# Patient Record
Sex: MALE | Race: WHITE | NOT HISPANIC OR LATINO | Employment: FULL TIME | URBAN - METROPOLITAN AREA
[De-identification: names, ages, dates, MRNs, and addresses within clinical notes are randomized per-mention and may not be internally consistent; named-entity substitution may affect disease eponyms.]

---

## 2021-05-29 ENCOUNTER — RECORDS - HEALTHEAST (OUTPATIENT)
Dept: ADMINISTRATIVE | Facility: CLINIC | Age: 31
End: 2021-05-29

## 2021-06-02 ENCOUNTER — RECORDS - HEALTHEAST (OUTPATIENT)
Dept: ADMINISTRATIVE | Facility: CLINIC | Age: 31
End: 2021-06-02

## 2023-11-01 ENCOUNTER — OFFICE VISIT (OUTPATIENT)
Dept: FAMILY MEDICINE | Facility: CLINIC | Age: 33
End: 2023-11-01
Payer: COMMERCIAL

## 2023-11-01 VITALS
TEMPERATURE: 98.9 F | BODY MASS INDEX: 19.15 KG/M2 | WEIGHT: 122 LBS | OXYGEN SATURATION: 98 % | DIASTOLIC BLOOD PRESSURE: 70 MMHG | HEIGHT: 67 IN | SYSTOLIC BLOOD PRESSURE: 106 MMHG | HEART RATE: 84 BPM | RESPIRATION RATE: 20 BRPM

## 2023-11-01 DIAGNOSIS — F69 BEHAVIOR CONCERN IN ADULT: ICD-10-CM

## 2023-11-01 DIAGNOSIS — F41.1 GAD (GENERALIZED ANXIETY DISORDER): ICD-10-CM

## 2023-11-01 DIAGNOSIS — F33.41 RECURRENT MAJOR DEPRESSIVE DISORDER, IN PARTIAL REMISSION (H): Primary | ICD-10-CM

## 2023-11-01 PROCEDURE — 99204 OFFICE O/P NEW MOD 45 MIN: CPT | Performed by: FAMILY MEDICINE

## 2023-11-01 RX ORDER — ESCITALOPRAM OXALATE 10 MG/1
10 TABLET ORAL DAILY
Qty: 30 TABLET | Refills: 5 | Status: SHIPPED | OUTPATIENT
Start: 2023-11-01

## 2023-11-01 ASSESSMENT — COLUMBIA-SUICIDE SEVERITY RATING SCALE - C-SSRS
1. WITHIN THE PAST MONTH, HAVE YOU WISHED YOU WERE DEAD OR WISHED YOU COULD GO TO SLEEP AND NOT WAKE UP?: YES
2. IN THE PAST MONTH, HAVE YOU ACTUALLY HAD ANY THOUGHTS OF KILLING YOURSELF?: NO
6. HAVE YOU EVER DONE ANYTHING, STARTED TO DO ANYTHING, OR PREPARED TO DO ANYTHING TO END YOUR LIFE?: NO

## 2023-11-01 ASSESSMENT — ANXIETY QUESTIONNAIRES
GAD7 TOTAL SCORE: 20
6. BECOMING EASILY ANNOYED OR IRRITABLE: NEARLY EVERY DAY
2. NOT BEING ABLE TO STOP OR CONTROL WORRYING: NEARLY EVERY DAY
IF YOU CHECKED OFF ANY PROBLEMS ON THIS QUESTIONNAIRE, HOW DIFFICULT HAVE THESE PROBLEMS MADE IT FOR YOU TO DO YOUR WORK, TAKE CARE OF THINGS AT HOME, OR GET ALONG WITH OTHER PEOPLE: EXTREMELY DIFFICULT
GAD7 TOTAL SCORE: 20
7. FEELING AFRAID AS IF SOMETHING AWFUL MIGHT HAPPEN: NEARLY EVERY DAY
5. BEING SO RESTLESS THAT IT IS HARD TO SIT STILL: MORE THAN HALF THE DAYS
3. WORRYING TOO MUCH ABOUT DIFFERENT THINGS: NEARLY EVERY DAY
1. FEELING NERVOUS, ANXIOUS, OR ON EDGE: NEARLY EVERY DAY

## 2023-11-01 ASSESSMENT — PATIENT HEALTH QUESTIONNAIRE - PHQ9
SUM OF ALL RESPONSES TO PHQ QUESTIONS 1-9: 21
SUM OF ALL RESPONSES TO PHQ QUESTIONS 1-9: 21
5. POOR APPETITE OR OVEREATING: NEARLY EVERY DAY
10. IF YOU CHECKED OFF ANY PROBLEMS, HOW DIFFICULT HAVE THESE PROBLEMS MADE IT FOR YOU TO DO YOUR WORK, TAKE CARE OF THINGS AT HOME, OR GET ALONG WITH OTHER PEOPLE: EXTREMELY DIFFICULT

## 2023-11-01 NOTE — COMMUNITY RESOURCES LIST (ENGLISH)
11/01/2023   [facility]  N/A  For questions about this resource list or additional care needs, please contact your primary care clinic or care manager.  Phone: [phone]   Email: N/A   Address: [address] [place] [address]   Hours: N/A        Financial Stability       Rent and mortgage payment assistance  1  [facility] ([facility]) - [place] Office - Supportive Housing Assistance Program (SHAP) - Rent and mortgage payment assistance Distance: 3.05 miles      Phone/Virtual   [address] [place] [address]  Language: English, Hmong, Caroline, Polish  Hours: Mon - Fri 8:30 AM - 5:00 PM  Fees: Free   Phone: (930) 761-3017 Email: [email or web address]@Vivartes.org Website: http://www.Vivartes.org/[facility]-impact-areas/     2  [facility] Stability - Rent and Mortgage Payment Assistance Distance: 3.38 miles      Phone/Virtual   [address] [place] [address]  Language: English, Hmong, Cymraes  Hours: Mon - Fri Appt. Only  Fees: Free   Phone: (107) 125-7720 Website: https://neighborhoodhousemn.org/          Food and Nutrition       Food pantry  3  [facility] - Food Distribution Program Distance: 0.35 miles      In-Person   [address] [place] [address]  Language: English, Hmong, Cymraes  Hours: Tue 3:00 PM - 5:00 PM  Fees: Free   Phone: (553) 503-7412 Email: info@theAlderaAptibleation.org Website: http://[facility].org/programs/rsjjsc-usmlrbdzy-tysadq/     4  [facility] Distance: 2.93 miles      Pickup   [address] [place] [address]  Language: American Sign Language, English, Hmong, Polish, Cymraes  Hours: Mon - Fri 12:00 PM - 1:00 PM  Fees: Free   Phone: (297) 988-1639 Email: info@Xcalar.org Website: https://www.[facility].org/locations/[facility]_ymca?utm_source=Homesnap&utm_medium=local&utm_campaign=local%20search     SNAP application assistance  5  [facility] -  [employer] - SNAP Outreach Distance: 3.38 miles      Phone/Virtual   [address] [place] [address]  Language: Mongolian, English, Hmong, Caroline, East Timorese  Hours: Mon - Fri 10:00 AM - 12:00 PM , Mon - Fri 2:00 PM - 4:00 PM  Fees: Free   Phone: (513) 700-3216 Website: https://[facility].org/     6  [place] Department of Human Services - MNFoodHelper (SNAP) Distance: 3.76 miles      Phone/Virtual   [address] [address] [place] [address]  Language: English, Hmong, Egyptian, Mongolian, East Timorese, Chadian  Hours: Mon - Fri 9:00 AM - 5:00 PM  Fees: Free   Phone: (362) 784-5752 Website: https://[place].gov/dhs/people-we-serve/adults/economic-assistance/food-nutrition/programs-and-services/supplemental-nutrition-assistance-program.jsp     Soup kitchen or free meals  7  [facility] - Loaves and Fishes - Loaves and Fishes Distance: 3.18 miles      [facility]   [address] [place] [address]  Language: English  Hours: Wed 5:30 PM - 6:30 PM  Fees: Free   Phone: (280) 224-6939 Email: office@[facility].org Website: https://www.[facility].org     8  [facility] - [facility] & Programs Distance: 3.59 miles      In-Person   [facility] [place] [address]  Language: English  Hours: Mon - Sun 6:30 AM - 7:30 AM , Mon - Sun 12:00 PM - 1:00 PM , Mon - Sun 5:30 PM - 6:30 PM  Fees: Free   Phone: (588) 752-2239 Email: info@[facility].org Website: https://[facility].org/about-us/contact/          Important Numbers & Websites       Emergency Services   911  City Services   311  Poison Control   (939) 942-3467  Suicide Prevention Lifeline   (881) 722-2156 (TALK)  Child Abuse Hotline   (237) 976-4349 (4-A-Child)  Sexual Assault Hotline   (547) 636-8611 (HOPE)  National Runaway Safeline   (215) 553-1497 (RUNAWAY)  All-Options Talkline   (415) 624-4988  Substance Abuse Referral   (269) 753-2926 (HELP)

## 2023-11-01 NOTE — PROGRESS NOTES
Assessment & Plan     (F33.41) Recurrent major depressive disorder, in partial remission (H24)  (primary encounter diagnosis)  Comment: pt has depression for years. He had treatment one year ago at treatment center for alcohol abuse, he took medication for around < 4 weeks and dced from the treatment center and medication dced.   He has been sober since the detox treatment. He was a heavy drinker for long time. Denies smoke and drugs.   He has three children 2, 3 and 11 years old, he is . He works as . He is the only one who works at his family. He is very stress about loss the job and not able to support his family.   Sometimes he thought about better off dead, but never have plan and never hurt himself and other people. He state he has a family to take care and will never hurt himself.   Phq9 score 21.     Plan: Adult Mental Health  Referral, Adult         Mental Health  Referral, escitalopram         (LEXAPRO) 10 MG tablet        Strongly advise to keep sober. We discussed the treatment with mental health referral for therapy and medication management. Will start lexapro first, advise to take half tablet for one to two weeks then take whole tablet side effect addressed.   Strongly advise to call 911 if he experience mental crisis and he understands.   Follow-up in one month.     (F41.1) BONITA (generalized anxiety disorder)  Comment: pt has anxiety for years. Sometimes he has panic attack. He has been sober for > one year.   Plan: Adult Mental Health  Referral, Adult         Mental Health  Referral, escitalopram         (LEXAPRO) 10 MG tablet        Will take lexapro 5 mg for 1-2 weeks then 10 mg daily. Side effect addressed.  Follow-up in one month.     (F69) Behavior concern in adult  Comment: pt state he has hard time to focus at work. He dose construction work. He denies childhood adhd.   Plan: Adult Mental Health  Referral        Will have  mental health testing.          Depression Screening Follow Up        11/1/2023     3:08 PM   PHQ   PHQ-9 Total Score 21   Q9: Thoughts of better off dead/self-harm past 2 weeks Several days   F/U: Thoughts of suicide or self-harm Yes   F/U: Self harm-plan No   F/U: Self-harm action No   F/U: Safety concerns No         11/1/2023     3:08 PM   Last PHQ-9   1.  Little interest or pleasure in doing things 3   2.  Feeling down, depressed, or hopeless 3   3.  Trouble falling or staying asleep, or sleeping too much 1   4.  Feeling tired or having little energy 3   5.  Poor appetite or overeating 3   6.  Feeling bad about yourself 3   7.  Trouble concentrating 3   8.  Moving slowly or restless 1   Q9: Thoughts of better off dead/self-harm past 2 weeks 1   PHQ-9 Total Score 21   In the past two weeks have you had thoughts of suicide or self harm? Yes   Do you have concerns about your personal safety or the safety of others? No   In the past 2 weeks have you thought about a plan or had intention to harm yourself? No   In the past 2 weeks have you acted on these thoughts in any way? No             11/1/2023     3:51 PM   C-SSRS (Brief [place])   Within the last month, have you wished you were dead or wished you could go to sleep and not wake up? Yes   Within the last month, have you had any actual thoughts of killing yourself? No   Within the last month, have you ever done anything, started to do anything, or prepared to do anything to end your life? No       Follow Up        Follow Up Actions Taken  Crisis resource information provided in the After Visit Summary    Discussed the following ways the patient can remain in a safe environment:  remove alcohol, remove drugs, dispose of old medications , and be around others  See Patient Instructions. Pt declined vaccine today.     Nevaeh Carmichael MD  [facility] SADIE Olmos is a 33 year old, presenting for the following health issues:  Establish  Care (Discuss starting back on anxiety/ depression medications, discuss ADHD)        11/1/2023     3:14 PM   Additional Questions   Roomed by Gen SUMAYA CMA       History of Present Illness       Reason for visit:  Depretion anxiaty adhd    He eats 0-1 servings of fruits and vegetables daily.He consumes 2 sweetened beverage(s) daily.He exercises with enough effort to increase his heart rate 60 or more minutes per day.  He exercises with enough effort to increase his heart rate 5 days per week.   He is taking medications regularly.         Depression and Anxiety Follow-Up  How are you doing with your depression since your last visit? No change  How are you doing with your anxiety since your last visit?  No change  Are you having other symptoms that might be associated with depression or anxiety? No  Have you had a significant life event? No   Do you have any concerns with your use of alcohol or other drugs? No    Social History     Tobacco Use    Smoking status: Never     Passive exposure: Never    Smokeless tobacco: Never   Vaping Use    Vaping Use: Some days    Substances: Nicotine   Substance Use Topics    Alcohol use: Not Currently     Comment: sober since 10/2022    Drug use: Never         11/1/2023     3:08 PM   PHQ   PHQ-9 Total Score 21   Q9: Thoughts of better off dead/self-harm past 2 weeks Several days   F/U: Thoughts of suicide or self-harm Yes   F/U: Self harm-plan No   F/U: Self-harm action No   F/U: Safety concerns No         11/1/2023     3:21 PM   BONITA-7 SCORE   Total Score 20         11/1/2023     3:08 PM   Last PHQ-9   1.  Little interest or pleasure in doing things 3   2.  Feeling down, depressed, or hopeless 3   3.  Trouble falling or staying asleep, or sleeping too much 1   4.  Feeling tired or having little energy 3   5.  Poor appetite or overeating 3   6.  Feeling bad about yourself 3   7.  Trouble concentrating 3   8.  Moving slowly or restless 1   Q9: Thoughts of better off dead/self-harm past 2  "weeks 1   PHQ-9 Total Score 21   In the past two weeks have you had thoughts of suicide or self harm? Yes   Do you have concerns about your personal safety or the safety of others? No   In the past 2 weeks have you thought about a plan or had intention to harm yourself? No   In the past 2 weeks have you acted on these thoughts in any way? No         11/1/2023     3:21 PM   BONITA-7    1. Feeling nervous, anxious, or on edge 3   2. Not being able to stop or control worrying 3   3. Worrying too much about different things 3   4. Trouble relaxing 3   5. Being so restless that it is hard to sit still 2   6. Becoming easily annoyed or irritable 3   7. Feeling afraid, as if something awful might happen 3   BONITA-7 Total Score 20   If you checked any problems, how difficult have they made it for you to do your work, take care of things at home, or get along with other people? Extremely difficult            11/1/2023     3:51 PM   C-SSRS (Brief [place])   Within the last month, have you wished you were dead or wished you could go to sleep and not wake up? Yes   Within the last month, have you had any actual thoughts of killing yourself? No   Within the last month, have you ever done anything, started to do anything, or prepared to do anything to end your life? No       Follow Up Actions Taken  Crisis resource information provided in the After Visit Summary  Mental Health Referral placed     Discussed the following ways the patient can remain in a safe environment:  remove alcohol, remove drugs, dispose of old medications , and be around others  Suicide Assessment Five-step Evaluation and Treatment (SAFE-T)          Review of Systems   Constitutional, HEENT, cardiovascular, pulmonary, gi and gu systems are negative, except as otherwise noted.      Objective    /70   Pulse 84   Temp 98.9  F (37.2  C) (Oral)   Resp 20   Ht 1.702 m (5' 7\")   Wt 55.3 kg (122 lb)   SpO2 98%   BMI 19.11 kg/m    Body mass index is 19.11 " kg/m .  Physical Exam   GENERAL: healthy, alert and no distress  Mental status exam; he is alert, orient to time, person and place. Normal thought content, speech, affect, mood and dress are noted. Pt has suicide ideation sometimes, but no plan and never hurt himself and other people.

## 2023-11-01 NOTE — COMMUNITY RESOURCES LIST (ENGLISH)
11/01/2023   [facility] - Outpatient Clinics  N/A  For additional resource needs, please contact your health insurance member services or your primary care team.  Phone: [phone]   Email: N/A   Address: [address] [place] [address]   Hours: N/A        Financial Stability       Rent and mortgage payment assistance  1  [employer] ([facility]) - [place] Office - Supportive Housing Assistance Program (SHAP) - Rent and mortgage payment assistance Distance: 3.05 miles      Phone/Virtual   [address] [place] [address]  Language: English, Hmong, Caroline, Bangladeshi  Hours: Mon - Fri 8:30 AM - 5:00 PM  Fees: Free   Phone: (969) 350-9211 Email: [email or web address]@K[employer]merstate.ru.org Website: http://www.[employer]merstate.ru.org/[facility]-impact-areas/     2  [facility] Stability - Rent and Mortgage Payment Assistance Distance: 3.38 miles      Phone/Virtual   [address] [place] [address]  Language: English, Hmong, Scottish  Hours: Mon - Fri Appt. Only  Fees: Free   Phone: (548) 723-3530 Website: https://Infin[employer] Corporationhousemn.org/          Food and Nutrition       Food pantry  3  [facility] ([facility] - Food Distribution Program Distance: 0.35 miles      In-Person   [address] [place] [address]  Language: English, Hmong, Scottish  Hours: Tue 3:00 PM - 5:00 PM  Fees: Free   Phone: (439) 788-3874 Email: info@the[facility] Center[facility] Indiaation.org Website: http://[facility].org/programs/oahifg-dketssupj-cnthho/     4  [facility] Distance: 2.93 miles      Pickup   [address] [place] [address]  Language: American Sign Language, English, Hmong, Bangladeshi, Scottish  Hours: Mon - Fri 12:00 PM - 1:00 PM  Fees: Free   Phone: (641) 999-9687 Email: info@KeepGo.org Website: https://www.[facility] [facility].org/locations/[facility]_ymca?utm_source=google&utm_medium=local&utm_campaign=local%20search     SNAP application assistance  5  [employer]  [place] Distance: 4.47 miles      Phone/Virtual   [address] [address]  Language: English, Hmong, South Sudanese, Kazakh, Ukrainian  Hours: Mon - Fri 8:30 AM - 4:30 PM  Fees: Free   Phone: (824) 959-7930 Email: helpline@sfilatinoZaplee.org Website: https://www.sfilatinoZaplee.org/programs/[place]-food-helpline/     6  [facility] - [place] - SNAP Outreach Distance: 3.38 miles      Phone/Virtual   [address] [place] [address]  Language: Greek, English, Hmong, Caroline, Ukrainian  Hours: Mon - Fri 10:00 AM - 12:00 PM , Mon - Fri 2:00 PM - 4:00 PM  Fees: Free   Phone: (285) 307-1535 Website: https://[facility].org/     Soup kitchen or free meals  7  [facility] - Loaves and Fishes - Loaves and Fishes Distance: 3.18 miles      [facility]   [address] [place] [address]  Language: English  Hours: Wed 5:30 PM - 6:30 PM  Fees: Free   Phone: (403) 773-3411 Email: office@[facility].org Website: https://www.[facility].org     8  [facility]'s [place] & Programs Distance: 3.59 miles      In-Person   [address] [place] [address]  Language: English  Hours: Mon - Sun 6:30 AM - 7:30 AM , Mon - Sun 12:00 PM - 1:00 PM , Mon - Sun 5:30 PM - 6:30 PM  Fees: Free   Phone: (420) 790-6281 Email: info@[facility].org Website: https://[facility].org/about-us/contact/          Important Numbers & Websites       [address].org  Poison Control   (551) 953-4249 [place]po[place]  Suicide and Crisis Lifeline   [address] [place], [place] [address]line.org  Childhelp National Child Abuse Hotline   588.215.4612 Childhelphotline.org  National Sexual Assault Hotline   (408) 910-3996 (HOPE) Rainn.org  National Runaway Safeline   (853) 922-3048 (RUNAWAY) 1800runaway.org  Pregnancy & Postpartum Support [place]   Call/text 416-764-6264 Ppsupportmn.org  Substance Abuse National Helpline ([facility]   812-735-HELP (2724) Findtreatment.gov  Emergency Services   [address]